# Patient Record
Sex: FEMALE
[De-identification: names, ages, dates, MRNs, and addresses within clinical notes are randomized per-mention and may not be internally consistent; named-entity substitution may affect disease eponyms.]

---

## 2019-01-03 ENCOUNTER — HOSPITAL ENCOUNTER (EMERGENCY)
Dept: HOSPITAL 31 - C.ER | Age: 29
Discharge: HOME | End: 2019-01-03
Payer: COMMERCIAL

## 2019-01-03 VITALS — OXYGEN SATURATION: 100 % | RESPIRATION RATE: 18 BRPM

## 2019-01-03 VITALS — SYSTOLIC BLOOD PRESSURE: 126 MMHG | TEMPERATURE: 98.9 F | HEART RATE: 85 BPM | DIASTOLIC BLOOD PRESSURE: 77 MMHG

## 2019-01-03 DIAGNOSIS — O03.9: Primary | ICD-10-CM

## 2019-01-03 DIAGNOSIS — N92.6: ICD-10-CM

## 2019-01-03 LAB
ALBUMIN SERPL-MCNC: 4.5 {NULL, G/DL} (ref 3.5–5)
ALBUMIN/GLOB SERPL: 1.4 {NULL, NULL} (ref 1–2.1)
ALT SERPL-CCNC: 19 {NULL, U/L} (ref 9–52)
AST SERPL-CCNC: 38 {NULL, U/L} (ref 14–36)
BACTERIA #/AREA URNS HPF: (no result) {NULL, NULL}
BASOPHILS # BLD AUTO: 0 {NULL, K/UL} (ref 0–0.2)
BASOPHILS NFR BLD: 0.3 {NULL, %} (ref 0–2)
BILIRUB UR-MCNC: NEGATIVE {NULL, NULL}
BUN SERPL-MCNC: 14 {NULL, MG/DL} (ref 7–17)
CALCIUM SERPL-MCNC: 9.2 {NULL, MG/DL} (ref 8.6–10.4)
EOSINOPHIL # BLD AUTO: 0 {NULL, K/UL} (ref 0–0.7)
EOSINOPHIL NFR BLD: 0.9 {NULL, %} (ref 0–4)
ERYTHROCYTE [DISTWIDTH] IN BLOOD BY AUTOMATED COUNT: 13.3 {NULL, %} (ref 11.5–14.5)
GFR NON-AFRICAN AMERICAN: > 60 {NULL, NULL}
GLUCOSE UR STRIP-MCNC: NORMAL {NULL, MG/DL}
HGB BLD-MCNC: 13 {NULL, G/DL} (ref 11–16)
LEUKOCYTE ESTERASE UR-ACNC: (no result) {NULL, LEU/UL}
LYMPHOCYTES # BLD AUTO: 1.4 {NULL, K/UL} (ref 1–4.3)
LYMPHOCYTES NFR BLD AUTO: 32.4 {NULL, %} (ref 20–40)
MCH RBC QN AUTO: 30.3 {NULL, PG} (ref 27–31)
MCHC RBC AUTO-ENTMCNC: 33.7 {NULL, G/DL} (ref 33–37)
MCV RBC AUTO: 89.8 {NULL, FL} (ref 81–99)
MONOCYTES # BLD: 0.5 {NULL, K/UL} (ref 0–0.8)
MONOCYTES NFR BLD: 11.2 {NULL, %} (ref 0–10)
NEUTROPHILS # BLD: 2.4 {NULL, K/UL} (ref 1.8–7)
NEUTROPHILS NFR BLD AUTO: 55.2 {NULL, %} (ref 50–75)
NRBC BLD AUTO-RTO: 0 {NULL, %} (ref 0–2)
PH UR STRIP: 6 {NULL, NULL} (ref 5–8)
PLATELET # BLD: 201 {NULL, K/UL} (ref 130–400)
PMV BLD AUTO: 9.3 {NULL, FL} (ref 7.2–11.7)
PROT UR STRIP-MCNC: NEGATIVE {NULL, MG/DL}
RBC # BLD AUTO: 4.29 {NULL, MIL/UL} (ref 3.8–5.2)
RBC # UR STRIP: (no result) {NULL, NULL}
SP GR UR STRIP: 1.01 {NULL, NULL} (ref 1–1.03)
SQUAMOUS EPITHIAL: 4 {NULL, /HPF} (ref 0–5)
UROBILINOGEN UR-MCNC: NORMAL {NULL, MG/DL} (ref 0.2–1)
WBC # BLD AUTO: 4.3 {NULL, K/UL} (ref 4.8–10.8)

## 2019-01-03 NOTE — C.PDOC
History Of Present Illness


28 year old  female presents to the ED complaining of vaginal spotting for 4

days, associated with pelvic pain. Reports similar prior episodes on and off 

since . Patient states she was evaluated at [facility], had an ultrasound, 

and was told she had a miscarriage. She reports following up at [facility] and the repeat beta had dropped to 1000. Patient states her home 

pregnancy tests have been positive in the interim. She denies any dizziness, 

lightheadedness, nausea, vomiting, diarrhea, or visual changes. 





Time Seen by Provider: 19 12:40


Chief Complaint (Nursing): Abdominal Pain


History Per: Patient


History/Exam Limitations: no limitations


Onset/Duration Of Symptoms: Days


Current Symptoms Are (Timing): Still Present





Past Medical History


Reviewed: Historical Data, Nursing Documentation, Vital Signs


Vital Signs: 





                                Last Vital Signs











Temp  99.4 F   19 12:28


 


Pulse  84   19 12:28


 


Resp  18   19 12:28


 


BP  118/78   19 12:28


 


Pulse Ox  100   19 12:28














- Medical History


PMH: No Chronic Diseases


Surgical History: No Surg Hx


Family History: States: No Known Family Hx





- Social History


Hx Tobacco Use: No


Hx Alcohol Use: No


Hx Substance Use: No





- Immunization History


Hx Tetanus Toxoid Vaccination: No


Hx Influenza Vaccination: Yes


Hx Pneumococcal Vaccination: No





Review Of Systems


Constitutional: Negative for: Fever, Chills


Eyes: Negative for: Vision Change


Cardiovascular: Negative for: Chest Pain


Respiratory: Negative for: Shortness of Breath


Gastrointestinal: Negative for: Nausea, Vomiting, Diarrhea


Genitourinary: Positive for: Vaginal Bleeding.  Negative for: Dysuria, Vaginal 

Discharge


Neurological: Negative for: Weakness, Dizziness





Physical Exam





- Physical Exam


Appears: Non-toxic, No Acute Distress


Skin: Warm, Dry, No Rash


Head: Atraumatic, Normacephalic


Eye(s): bilateral: Normal Inspection


Oral Mucosa: Moist


Neck: Normal ROM


Chest: Symmetrical


Cardiovascular: Rhythm Regular, No Murmur


Respiratory: Normal Breath Sounds, No Rales, No Rhonchi, No Wheezing


Gastrointestinal/Abdominal: Bowel Sounds (active), Soft, No Tenderness, No 

Guarding


Back: Normal Inspection, No Vertebral Tenderness


Extremity: Bilateral: Atraumatic, Normal Color And Temperature, Normal ROM


Neurological/Psych: Oriented x3, Normal Speech


Gait: Steady





ED Course And Treatment





- Laboratory Results


Result Diagrams: 


                                 19 13:28





                                 19 13:28


O2 Sat by Pulse Oximetry: 100 (RA)


Pulse Ox Interpretation: Normal





- CT Scan/US


  ** Transvaginal US


Other Rad Studies (CT/US): Read By Radiologist, Radiology Report Reviewed


CT/US Interpretation: FINDINGS:  UTERUS:  Measures 7.9 x 4.6 x 5.6 cm. Ante

verted. 1.3 x 1.4 x 1.7 cm heterogeneous uterine mass consistent with intramural

fundal fibroid.  ENDOMETRIUM:  Heterogeneous thickened vascular endometrium 

measuring approximately 2 cm in diameter.  CERVIX:  No cervical abnormality 

identified.  RIGHT OVARY:  Measures 2.7 x 1.7 x 2.4 cm. Blood flow is 

demonstrated.  LEFT OVARY:  Measures 2.6 x 1.3 x 2.8 cm. Blood flow is 

demonstrated.  FREE FLUID:  No significant free fluid noted.  OTHER FINDINGS:  

None.  IMPRESSION:  Abnormally thickened heterogeneous and vascular appearance 

of the endometrium. Correlate clinically. In the proper clinical setting 

appearance consistent with retained products of conception.  1.3 x 1.4 x 1.7 cm 

heterogeneous uterine mass consistent with intramural fundal fibroid.





Medical Decision Making


Medical Decision Making: 





Impression: Persistent vaginal bleeding and pelvic pain





Plan:


--Blood work with beta quant


--Urinalysis


--Tylenol 650 mg PO





Labs reviewed, HCG positive.


Pelvic US ordered.


[id number] Spoke with Dr Carlos Enrique PERLA on call to discuss case. She agreed based on histor

y and findings, there is no likelyhood of retained POC. She  recommends 

discharge home with Rx for methergine. 


Patient re-evaluated and remained well in no acute distress. Vital signs normal.

I explained all results to the patient and provided copy of results. Recommend 

follow up in the clinic and to repeat bloodwork and US in one week. 





Disposition


Counseled Patient/Family Regarding: Diagnosis, Need For Followup, Rx Given





- Disposition


Referrals: 


[facility] [Outside]


[facility] [employer] [facility] [Outside]


[facility] [Outside]


Disposition: HOME/ ROUTINE


Disposition Time: 15:31


Condition: GOOD


Additional Instructions: 


Usted fue evaluado hoy por milad sntomas agudos. Los estudios realizados fueron 

normales. [facility] medicamentos sahil veces al da panda 3 noriega. [place] medicamentos

 para el dolor (Tylenol o Advil) segn sea necesario.


Antonia un seguimiento en la clnica o con stringer mdico para obtener ms atencin y 

manejo. Debes repetir el anlisis de elisha hasta el nivel hormonal 0


Prescriptions: 


Methylergonovine [Methergine] 0.2 mg PO TID #9 tab


Instructions:  Miscarriage (DC)


Forms:  [employer] (English)


Print Language: Albanian





- POA


Present On Arrival: None





- Clinical Impression


Clinical Impression: 


 Menses, irregular, Miscarriage








- PA / NP / Resident Statement


MD/DO has reviewed & agrees with the documentation as recorded.





- Scribe Statement


The provider has reviewed the documentation as recorded by the Adenibsekou Hayden





All medical record entries made by the Scribe were at my direction and 

personally dictated by me. I have reviewed the chart and agree that the record 

accurately reflects my personal performance of the history, physical exam, 

medical decision making, and the department course for this patient. I have also

 personally directed, reviewed, and agree with the discharge instructions and 

disposition.

## 2019-01-03 NOTE — US
Date of service: 01/03/2019



HISTORY:

pelvic pain, bleeding, h.o recent AB 11/23/18



COMPARISON:

None available.



TECHNIQUE:

Real-time transabdominal pelvic ultrasound was performed. In addition 

a transvaginal pelvic ultrasound was necessary to better depict 

pelvic anatomy.



FINDINGS:



UTERUS:

Measures 7.9 x 4.6 x 5.6 cm. Anteverted. 1.3 x 1.4 x 1.7 cm 

heterogeneous uterine mass consistent with intramural fundal fibroid. 



ENDOMETRIUM:

Heterogeneous thickened vascular endometrium measuring approximately 

2 cm in diameter. 



CERVIX:

No cervical abnormality identified.



RIGHT OVARY:

Measures 2.7 x 1.7 x 2.4 cm. Blood flow is demonstrated.  



LEFT OVARY:

Measures 2.6 x 1.3 x 2.8 cm. Blood flow is demonstrated.  



FREE FLUID:

No significant free fluid noted.



OTHER FINDINGS:

None. 



IMPRESSION:

Abnormally thickened heterogeneous and vascular appearance of the 

endometrium. Correlate clinically. In the proper clinical setting 

appearance consistent with retained products of conception. 



1.3 x 1.4 x 1.7 cm heterogeneous uterine mass consistent with 

intramural fundal fibroid.

## 2019-01-24 ENCOUNTER — HOSPITAL ENCOUNTER (EMERGENCY)
Dept: HOSPITAL 31 - C.ER | Age: 29
Discharge: HOME | End: 2019-01-24
Payer: COMMERCIAL

## 2019-01-24 VITALS — BODY MASS INDEX: 35 KG/M2

## 2019-01-24 VITALS — OXYGEN SATURATION: 99 %

## 2019-01-24 VITALS — HEART RATE: 82 BPM | SYSTOLIC BLOOD PRESSURE: 107 MMHG | TEMPERATURE: 98.2 F | DIASTOLIC BLOOD PRESSURE: 78 MMHG

## 2019-01-24 VITALS — RESPIRATION RATE: 18 BRPM

## 2019-01-24 DIAGNOSIS — O03.4: Primary | ICD-10-CM

## 2019-01-24 LAB
ALBUMIN SERPL-MCNC: 4.7 {NULL, G/DL} (ref 3.5–5)
ALBUMIN/GLOB SERPL: 1.4 {NULL, NULL} (ref 1–2.1)
ALT SERPL-CCNC: 7 {NULL, U/L} (ref 9–52)
APTT BLD: 31 {NULL, SECONDS} (ref 21–34)
AST SERPL-CCNC: 29 {NULL, U/L} (ref 14–36)
BACTERIA #/AREA URNS HPF: (no result) {NULL, NULL}
BASOPHILS # BLD AUTO: 0 {NULL, K/UL} (ref 0–0.2)
BASOPHILS NFR BLD: 0.3 {NULL, %} (ref 0–2)
BILIRUB UR-MCNC: NEGATIVE {NULL, NULL}
BUN SERPL-MCNC: 13 {NULL, MG/DL} (ref 7–17)
CALCIUM SERPL-MCNC: 9.5 {NULL, MG/DL} (ref 8.6–10.4)
EOSINOPHIL # BLD AUTO: 0 {NULL, K/UL} (ref 0–0.7)
EOSINOPHIL NFR BLD: 0.6 {NULL, %} (ref 0–4)
ERYTHROCYTE [DISTWIDTH] IN BLOOD BY AUTOMATED COUNT: 13.6 {NULL, %} (ref 11.5–14.5)
GFR NON-AFRICAN AMERICAN: > 60 {NULL, NULL}
GLUCOSE UR STRIP-MCNC: NORMAL {NULL, MG/DL}
HCG,QUALITATIVE URINE: POSITIVE {NULL, NULL}
HGB BLD-MCNC: 13.5 {NULL, G/DL} (ref 11–16)
INR PPP: 1.1 {NULL, NULL}
LEUKOCYTE ESTERASE UR-ACNC: (no result) {NULL, LEU/UL}
LIPASE: 192 {NULL, U/L} (ref 23–300)
LYMPHOCYTES # BLD AUTO: 1.7 {NULL, K/UL} (ref 1–4.3)
LYMPHOCYTES NFR BLD AUTO: 34.6 {NULL, %} (ref 20–40)
MCH RBC QN AUTO: 29.9 {NULL, PG} (ref 27–31)
MCHC RBC AUTO-ENTMCNC: 33 {NULL, G/DL} (ref 33–37)
MCV RBC AUTO: 90.8 {NULL, FL} (ref 81–99)
MONOCYTES # BLD: 0.5 {NULL, K/UL} (ref 0–0.8)
MONOCYTES NFR BLD: 10.6 {NULL, %} (ref 0–10)
NEUTROPHILS # BLD: 2.7 {NULL, K/UL} (ref 1.8–7)
NEUTROPHILS NFR BLD AUTO: 53.9 {NULL, %} (ref 50–75)
NRBC BLD AUTO-RTO: 0 {NULL, %} (ref 0–2)
PH UR STRIP: 6 {NULL, NULL} (ref 5–8)
PLATELET # BLD: 228 {NULL, K/UL} (ref 130–400)
PMV BLD AUTO: 9.5 {NULL, FL} (ref 7.2–11.7)
PROT UR STRIP-MCNC: NEGATIVE {NULL, MG/DL}
PROTHROMBIN TIME: 12.2 {NULL, SECONDS} (ref 9.7–12.2)
RBC # BLD AUTO: 4.49 {NULL, MIL/UL} (ref 3.8–5.2)
RBC # UR STRIP: NEGATIVE {NULL, NULL}
SP GR UR STRIP: 1.02 {NULL, NULL} (ref 1–1.03)
SQUAMOUS EPITHIAL: 3 {NULL, /HPF} (ref 0–5)
UROBILINOGEN UR-MCNC: NORMAL {NULL, MG/DL} (ref 0.2–1)
WBC # BLD AUTO: 5 {NULL, K/UL} (ref 4.8–10.8)

## 2019-01-24 NOTE — US
Date of service: 01/24/2019



HISTORY:

suprapubic abdominal pain



COMPARISON:

Pelvic ultrasound performed 1/3/19



TECHNIQUE:

Real-time transabdominal pelvic ultrasound was performed. In addition 

a transvaginal pelvic ultrasound was necessary to better depict 

pelvic anatomy. 



FINDINGS:



UTERUS:

Measures 8.2 x 4.5 x 5.6 cm. Anteverted.  1.3 x 1.4 x 1.3 cm fundal 

uterine fibroid. 



ENDOMETRIUM:

Heterogeneous appearing endometrium measures approximately 2.0 cm in 

diameter.  Increased vascularity. 



CERVIX:

No cervical abnormality identified.



RIGHT OVARY:

Measures 3.3 x 1.9 x 3.1 cm. Blood flow is demonstrated.  Complex 

appearing cysts measuring approximately 1.9 x 1.0 x 1.8 cm and 1.4 x 

1.0 x 1.3 cm.



LEFT OVARY:

Measures 2.6 x 1.7 x 2.3 cm. Blood flow is demonstrated.  



FREE FLUID:

No significant free fluid noted.



OTHER FINDINGS:

None. 



IMPRESSION:

Abnormal thickened hypervascular appearance of the endometrium 

persists.  In the proper clinical setting appearance consistent with 

retained products of conception.  Alternatives including endometritis 

may be considered.  Correlate clinically. 



Uterine fibroid.



Complex appearing right ovarian cysts.  Attention on follow-up.



Discussed with Dr. Kerr on 1/24/19 at 6:17 p.m.

## 2019-01-24 NOTE — C.PDOC
History Of Present Illness


29yo female, comes to ER reporting she has suprapubic abdominal pain x 3 days. 

Patient states she had a recent miscarriage in 2018 and did not need a DNC 

at that time. She was seen in this ER earlier this month due to similar pain, 

had an ultrasound which showed inflammation. Patint states her LMP was on  

and since then she has had intermittent spotting. Otherwise no fever, chills, 

chest pain, shortness of breath, vomiting, diarrhea, vaginal discharge or gross 

vaginal bleeding. No additional complaints.


Time Seen by Provider: 19 15:22


Chief Complaint (Nursing): Abdominal Pain


History Per: Patient


History/Exam Limitations: no limitations


Onset/Duration Of Symptoms: Days


Current Symptoms Are (Timing): Still Present


Location Of Pain/Discomfort: Suprapubic


Quality Of Discomfort: "Pain"


Abnormal Vaginal Bleeding: No


Last Menstral Period: 18


Miscarriage: 1





Past Medical History


Reviewed: Historical Data, Nursing Documentation, Vital Signs


Vital Signs: 





                                Last Vital Signs











Temp  98.1 F   19 15:02


 


Pulse  77   19 15:02


 


Resp  18   19 15:02


 


BP  110/72   19 15:02


 


Pulse Ox  99   19 15:02














- Medical History


PMH: No Chronic Diseases


Surgical History: No Surg Hx


Family History: States: No Known Family Hx





- Social History


Hx Tobacco Use: No


Hx Alcohol Use: No


Hx Substance Use: No





- Immunization History


Hx Tetanus Toxoid Vaccination: No


Hx Influenza Vaccination: No


Hx Pneumococcal Vaccination: No





Review Of Systems


Except As Marked, All Systems Reviewed And Found Negative.


Constitutional: Negative for: Fever, Chills


Cardiovascular: Negative for: Chest Pain


Respiratory: Negative for: Shortness of Breath


Gastrointestinal: Positive for: Abdominal Pain


Genitourinary: Negative for: Vaginal Discharge, Vaginal Bleeding





Physical Exam





- Physical Exam


Appears: Non-toxic, No Acute Distress


Skin: Normal Color


Head: Atraumatic, Normacephalic


Eye(s): bilateral: Normal Inspection


Neck: Normal ROM, Supple


Chest: Symmetrical


Cardiovascular: Rhythm Regular


Respiratory: Normal Breath Sounds


Gastrointestinal/Abdominal: Soft, Tenderness (suparpubic), No Guarding, No 

Rebound


Back: Normal Inspection


Extremity: Normal ROM





ED Course And Treatment





- Laboratory Results


Result Diagrams: 


                                 19 16:13





                                 19 16:13


O2 Sat by Pulse Oximetry: 99 (RA)


Pulse Ox Interpretation: Normal





- CT Scan/US


  ** US Transvaginal


Other Rad Studies (CT/US): Radiology Report Reviewed


CT/US Interpretation: FINDINGS:  UTERUS:  Measures 8.2 x 4.5 x 5.6 cm. 

Anteverted.  1.3 x 1.4 x 1.3 cm fundal uterine fibroid.  ENDOMETRIUM:  

Heterogeneous appearing endometrium measures approximately 2.0 cm in diameter.  

Increased vascularity.  CERVIX:  No cervical abnormality identified.  RIGHT 

OVARY:  Measures 3.3 x 1.9 x 3.1 cm. Blood flow is demonstrated.  Complex 

appearing cysts measuring approximately 1.9 x 1.0 x 1.8 cm and 1.4 x 1.0 x 1.3 

cm.  LEFT OVARY:  Measures 2.6 x 1.7 x 2.3 cm. Blood flow is demonstrated.  FREE

FLUID:  No significant free fluid noted.  OTHER FINDINGS:  None.  IMPRESSION:  

Abnormal thickened hypervascular appearance of the endometrium persists.  In the

proper clinical setting appearance consistent with retained products of 

conception.  Alternatives including endometritis may be considered.  Correlate 

clinically.  Uterine fibroid.  Complex appearing right ovarian cysts.  Attention

on follow-up.





Medical Decision Making


Medical Decision Makinyo female, recent history of a miscarriage, complaining of suprapubic 

abdominal pain. No current abnl vaginal d/c or rash. 


Patient had previous US showing inflammation.


Likely PID.


Plan:


-- Labs


-- US Transvaginal








US report reviewed, and indicates RPOC.


Labs reviewed, patient has a beta-hcg level of 90; prior records reviewed, 

patient had beta-hcg of 83.21 on 1/3 and 92.70 today.








Discussed with Dr. Monaco, OB on call, who states she evaluated patient during 

her last visit and recommended methergine prescription.


Patient states she did not take methergine as the "copay was too high"; 

discussed with patient importance of taking prescribed medication and states she

will take it this time.


pt in NAD, agreeable to plan. 


Dr. Monaco advises patient to be discharged home with prescription for 

methergine; patient given instructions for follow up at [facility] 

clinic/OBGYN





Disposition





- Disposition


Referrals: 


Joanne Monaco DO [Staff Provider] - 


[facility] [Outside]


[facility] [Outside]


[facility] [Outside]


[facility] [Outside]


Disposition: HOME/ ROUTINE


Disposition Time: 19:14


Condition: GOOD


Additional Instructions: 





CALVIN DANIEL, thank you for letting us take care of you today. 

Your provider was Juan Kerr and you were treated for PELVIC PAIN. The emergency 

medical care you received today was directed at your acute symptoms. If you were

prescribed any medication, please fill it and take as directed. It may take 

several days for your symptoms to resolve. Return to the Emergency Department if

your symptoms worsen, do not improve, or if you have any other problems.





Please contact your doctor or call one of the physicians/clinics you have been 

referred to that are listed on the Patient Visit Information form that is 

included in your discharge packet. Bring any paperwork you were given at d

ischarge with you along with any medications you are taking to your follow up 

visit. Our treatment cannot replace ongoing medical care by a primary care 

provider outside of the emergency department.





Thank you for allowing the [email or web address] team to be part of your care today.








If you had an X-Ray or CT scan: A Radiologist will review the ED reading if any 

change in treatment is needed we will contact you.***





If you had a blood, urine, or wound culture: It will take several days for the 

results, if any change in treatment is needed we will contact you.***





If you had an STI test: It will take 48 hours for the results. Please call after

1 week if you have not heard back.***


Prescriptions: 


Methylergonovine Maleate [Methergine] 0.2 mg PO Q8H 3 Days #9 tablet


Instructions:  Acute Pelvic Pain


Forms:  Lang-8 (English), Lang-8 (Finnish)


Print Language: Malagasy





- Clinical Impression


Clinical Impression: 


 Retained products of conception








- Scribe Statement


The provider has reviewed the documentation as recorded by the Kvng Harris


Provider Attestation: 


All medical record entries made by the Kvng were at my direction and 

personally dictated by me. I have reviewed the chart and agree that the record 

accurately reflects my personal performance of the history, physical exam, 

medical decision making, and the department course for this patient. I have also

 personally directed, reviewed, and agree with the discharge instructions and 

disposition.